# Patient Record
Sex: FEMALE | Race: WHITE | NOT HISPANIC OR LATINO | Employment: FULL TIME | URBAN - METROPOLITAN AREA
[De-identification: names, ages, dates, MRNs, and addresses within clinical notes are randomized per-mention and may not be internally consistent; named-entity substitution may affect disease eponyms.]

---

## 2024-08-07 ENCOUNTER — OFFICE VISIT (OUTPATIENT)
Dept: OBSTETRICS AND GYNECOLOGY | Facility: CLINIC | Age: 44
End: 2024-08-07
Payer: COMMERCIAL

## 2024-08-07 VITALS
SYSTOLIC BLOOD PRESSURE: 130 MMHG | WEIGHT: 142 LBS | DIASTOLIC BLOOD PRESSURE: 74 MMHG | BODY MASS INDEX: 26.81 KG/M2 | HEIGHT: 61 IN | HEART RATE: 75 BPM

## 2024-08-07 DIAGNOSIS — Z01.419 ENCOUNTER FOR GYNECOLOGICAL EXAMINATION WITHOUT ABNORMAL FINDING: Primary | ICD-10-CM

## 2024-08-07 PROCEDURE — G0123 SCREEN CERV/VAG THIN LAYER: HCPCS | Performed by: NURSE PRACTITIONER

## 2024-08-07 PROCEDURE — 87624 HPV HI-RISK TYP POOLED RSLT: CPT | Performed by: NURSE PRACTITIONER

## 2024-08-07 NOTE — PROGRESS NOTES
"Well Woman Visit    CC: Scheduled annual well gyn visit  Chief Complaint   Patient presents with    Gynecologic Exam     wwe       Myriad intake in the past?: no  NOT DONE TODAY due to: new     HPI:   44 y.o.   Social History     Substance and Sexual Activity   Sexual Activity Yes    Partners: Male    Birth control/protection: Vasectomy    Comment: ablation       Menses:   No bleeding since ablation    PCP: does manage PMHx and preventative labs  History: PMHx, Meds, Allergies, PSHx, Social Hx, and POBHx all reviewed and updated.    Pt has no complaints today.    PHYSICAL EXAM:  /74   Pulse 75   Ht 154.9 cm (61\")   Wt 64.4 kg (142 lb)   Breastfeeding No   BMI 26.83 kg/m²  Not found.     Exam conducted with a chaperone present  General- NAD, alert and oriented, appropriate  Psych- Normal mood, good memory  Neck- No masses, no thyroid enlargement  CV- Regular rhythm, no murnurs  Resp- CTA to bases, no wheezes  Abdomen- Soft, non distended, non tender, no masses    Breast left-  Bilaterally symmetrical, no masses, non tender, no nipple discharge  Breast right- Bilaterally symmetrical, no masses, non tender, no nipple discharge    External genitalia- Normal female, no lesions  Urethra/meatus- Normal, no masses, non tender  Bladder- Normal, no masses, non tender  Vagina- Normal, no atrophy, no lesions, no discharge.  Prolapse : none noted, not examined with split speculum to delineate  Cvx- Normal, no lesions, no discharge, No cervical motion tenderness  Uterus- Normal size, shape & consistency.  Non tender, mobile.  Adnexa- No mass, non tender  Anus/Rectum/Perineum- Not performed    Lymphatic- No palpable neck, axillary, or groin nodes  Ext- No edema, no cyanosis    Skin- No lesions, no rashes, no acanthosis nigricans      ASSESSMENT and PLAN:    Diagnoses and all orders for this visit:    1. Encounter for gynecological examination without abnormal finding (Primary)  -     IgP, Aptima HPV  -     Mammo " Screening Digital Tomosynthesis Bilateral With CAD; Future        Preventative:  BREAST HEALTH- Monthly self breast exam importance and how to reviewed. MMG and/or MRI (prn) reviewed per society guidelines and her individual history. Screen: Updated today  CERVICAL CANCER Screening- Reviewed current ASCCP guidelines for screening w and wo cotest HPV, age specific.  Screen: Updated today  SEXUAL HEALTH: Declines STD screening  VACCINATIONS Recommended: Covid vaccine, Flu annually.  Importance discussed, risk being unvaccinated reviewed.  Questions answered  Smoking status- NON SMOKER/VAPER        She understands the importance of having any ordered tests to be performed in a timely fashion.  The risks of not performing them include, but are not limited to, advanced cancer stages, bone loss from osteoporosis and/or subsequent increase in morbidity and/or mortality.  She is encouraged to review her results online and/or contact or office if she has questions.     Follow Up:  Return in about 1 year (around 8/7/2025) for Annual physical.          Joby Leo, APRN  08/07/2024    [facility] [place]  [facility]  [address] [address]  Dept: [phone]  Dept Fax: [phone]  Loc: [phone]

## 2024-08-13 LAB
CYTOLOGIST CVX/VAG CYTO: NORMAL
CYTOLOGY CVX/VAG DOC CYTO: NORMAL
CYTOLOGY CVX/VAG DOC THIN PREP: NORMAL
DX ICD CODE: NORMAL
HPV I/H RISK 4 DNA CVX QL PROBE+SIG AMP: NEGATIVE
Lab: NORMAL
OTHER STN SPEC: NORMAL
STAT OF ADQ CVX/VAG CYTO-IMP: NORMAL

## 2024-09-10 ENCOUNTER — HOSPITAL ENCOUNTER (OUTPATIENT)
Dept: MAMMOGRAPHY | Facility: HOSPITAL | Age: 44
Discharge: HOME | End: 2024-09-10
Admitting: NURSE PRACTITIONER
Payer: COMMERCIAL

## 2024-09-10 DIAGNOSIS — Z01.419 ENCOUNTER FOR GYNECOLOGICAL EXAMINATION WITHOUT ABNORMAL FINDING: ICD-10-CM

## 2024-09-10 PROCEDURE — 77067 SCR MAMMO BI INCL CAD: CPT

## 2024-09-10 PROCEDURE — 77063 BREAST TOMOSYNTHESIS BI: CPT

## 2024-11-14 ENCOUNTER — OFFICE VISIT (OUTPATIENT)
Dept: OBSTETRICS AND GYNECOLOGY | Facility: CLINIC | Age: 44
End: 2024-11-14
Payer: COMMERCIAL

## 2024-11-14 VITALS
BODY MASS INDEX: 26.83 KG/M2 | HEART RATE: 76 BPM | SYSTOLIC BLOOD PRESSURE: 122 MMHG | DIASTOLIC BLOOD PRESSURE: 82 MMHG | WEIGHT: 142 LBS

## 2024-11-14 DIAGNOSIS — N89.8 VAGINAL ODOR: Primary | ICD-10-CM

## 2024-11-14 LAB
BILIRUB BLD-MCNC: NEGATIVE MG/DL
CLARITY, POC: ABNORMAL
COLOR UR: YELLOW
GLUCOSE UR STRIP-MCNC: NEGATIVE MG/DL
KETONES UR QL: NEGATIVE
LEUKOCYTE EST, POC: ABNORMAL
NITRITE UR-MCNC: NEGATIVE MG/ML
PH UR: 7 [PH] (ref 5–8)
PROT UR STRIP-MCNC: NEGATIVE MG/DL
RBC # UR STRIP: NEGATIVE /UL
SP GR UR: 1.01 (ref 1–1.03)
UROBILINOGEN UR QL: NORMAL

## 2024-11-14 RX ORDER — METRONIDAZOLE 500 MG/1
500 TABLET ORAL 2 TIMES DAILY
Qty: 14 TABLET | Refills: 0 | Status: SHIPPED | OUTPATIENT
Start: 2024-11-14 | End: 2024-11-21

## 2024-11-14 NOTE — PROGRESS NOTES
GYN Visit    CC:   Chief Complaint   Patient presents with    Follow-up     Vaginal discharge and odor        HPI:   44 y.o. Contraception or HRT: Contraception:  Vasectomy     Menses:   no cycle since ablation  Pain:  None    Feels like she has increased discharge, with an odor.  Has been going for a few weeks.  No concerns for STIs.        History: PMHx, Meds, Allergies, PSHx, Social Hx, and POBHx all reviewed and updated.    Review of Systems   Constitutional: Negative.    Genitourinary:  Positive for vaginal discharge.       PHYSICAL EXAM:  /82   Pulse 76   Wt 64.4 kg (142 lb)   BMI 26.83 kg/m²      Physical Exam  Vitals and nursing note reviewed. Exam conducted with a chaperone present.   Constitutional:       Appearance: Normal appearance. She is well-developed and well-groomed.   HENT:      Head: Normocephalic.   Eyes:      Pupils: Pupils are equal, round, and reactive to light.   Genitourinary:     General: Normal vulva.      Exam position: Lithotomy position.      Pubic Area: No rash or pubic lice.       Labia:         Right: No rash, tenderness, lesion or injury.         Left: No rash, tenderness, lesion or injury.       Vagina: Normal. No foreign body. Vaginal discharge (thin, white) present. No erythema, tenderness, bleeding or lesions.      Cervix: No cervical motion tenderness or discharge.   Skin:     General: Skin is warm and dry.   Neurological:      General: No focal deficit present.      Mental Status: She is alert.         ASSESSMENT AND PLAN:  Diagnoses and all orders for this visit:    1. Vaginal odor (Primary)  -     POC Urinalysis Dipstick  -     NuSwab BV & Candida - Swab, Cervix  -     metroNIDAZOLE (Flagyl) 500 MG tablet; Take 1 tablet by mouth 2 (Two) Times a Day for 7 days.  Dispense: 14 tablet; Refill: 0        Counseling:  Will treat for BV while awaiting swab results    Follow Up:  Return for as needed.      Joby Leo, APRN  2024    [facility]  [facility]  [address] [place] [address]  Dept: [phone]  Dept Fax: [phone]  Loc: [phone]

## 2024-11-16 LAB
A VAGINAE DNA VAG QL NAA+PROBE: NORMAL SCORE
BVAB2 DNA VAG QL NAA+PROBE: NORMAL SCORE
C ALBICANS DNA VAG QL NAA+PROBE: NEGATIVE
C GLABRATA DNA VAG QL NAA+PROBE: NEGATIVE
MEGA1 DNA VAG QL NAA+PROBE: NORMAL SCORE

## 2025-06-05 ENCOUNTER — OFFICE VISIT (OUTPATIENT)
Dept: OBSTETRICS AND GYNECOLOGY | Age: 45
End: 2025-06-05
Payer: COMMERCIAL

## 2025-06-05 VITALS
BODY MASS INDEX: 25.51 KG/M2 | DIASTOLIC BLOOD PRESSURE: 79 MMHG | SYSTOLIC BLOOD PRESSURE: 116 MMHG | WEIGHT: 135 LBS | HEART RATE: 86 BPM

## 2025-06-05 DIAGNOSIS — R61 NIGHT SWEATS: ICD-10-CM

## 2025-06-05 DIAGNOSIS — R35.0 URINE FREQUENCY: Primary | ICD-10-CM

## 2025-06-05 LAB
BILIRUB BLD-MCNC: NEGATIVE MG/DL
CLARITY, POC: ABNORMAL
COLOR UR: YELLOW
FSH SERPL-ACNC: 15.9 MIU/ML
GLUCOSE UR STRIP-MCNC: NEGATIVE MG/DL
KETONES UR QL: ABNORMAL
LEUKOCYTE EST, POC: NEGATIVE
NITRITE UR-MCNC: NEGATIVE MG/ML
PH UR: 5 [PH] (ref 5–8)
PROT UR STRIP-MCNC: ABNORMAL MG/DL
RBC # UR STRIP: ABNORMAL /UL
SP GR UR: 1.03 (ref 1–1.03)
T4 FREE SERPL-MCNC: 1.23 NG/DL (ref 0.92–1.68)
TSH SERPL DL<=0.05 MIU/L-ACNC: 1.34 UIU/ML (ref 0.27–4.2)
UROBILINOGEN UR QL: NORMAL

## 2025-06-05 PROCEDURE — 84443 ASSAY THYROID STIM HORMONE: CPT | Performed by: NURSE PRACTITIONER

## 2025-06-05 PROCEDURE — 83001 ASSAY OF GONADOTROPIN (FSH): CPT | Performed by: NURSE PRACTITIONER

## 2025-06-05 PROCEDURE — 87086 URINE CULTURE/COLONY COUNT: CPT | Performed by: NURSE PRACTITIONER

## 2025-06-05 PROCEDURE — 84439 ASSAY OF FREE THYROXINE: CPT | Performed by: NURSE PRACTITIONER

## 2025-06-05 NOTE — PROGRESS NOTES
GYN Visit    CC:   Chief Complaint   Patient presents with    Follow-up     Stress incontinence feels wet all the time       HPI:   44 y.o. Contraception or HRT: Contraception:  None    Patient is here with concerns today.   Feels wet all the time. It smells like urine.  Constant all day.  Has been going for a few months.  No pain with urination.  Does not lose urine with cough or sneezing.  Does have urinary frequency.    Has been having night sweats.     History: PMHx, Meds, Allergies, PSHx, Social Hx, and POBHx all reviewed and updated.    Review of Systems   Constitutional: Negative.    Genitourinary:  Positive for frequency.        Night sweats       PHYSICAL EXAM:  /79   Pulse 86   Wt 61.2 kg (135 lb)   BMI 25.51 kg/m²      Physical Exam  Vitals and nursing note reviewed. Exam conducted with a chaperone present.   Constitutional:       Appearance: Normal appearance. She is well-developed and well-groomed.   HENT:      Head: Normocephalic.   Eyes:      Pupils: Pupils are equal, round, and reactive to light.   Genitourinary:     General: Normal vulva.      Exam position: Lithotomy position.      Pubic Area: No rash or pubic lice.       Labia:         Right: No rash, tenderness, lesion or injury.         Left: No rash, tenderness, lesion or injury.       Vagina: Normal. No foreign body. No vaginal discharge, erythema, tenderness, bleeding or lesions.      Cervix: No cervical motion tenderness or discharge.      Uterus: Normal.       Adnexa: Right adnexa normal and left adnexa normal.      Comments: Examined with split speculum, no prolapse noted  Skin:     General: Skin is warm and dry.   Neurological:      General: No focal deficit present.      Mental Status: She is alert.         ASSESSMENT AND PLAN:  Diagnoses and all orders for this visit:    1. Urine frequency (Primary)  -     POC Urinalysis Dipstick  -     Urine Culture - Urine, Urine, Clean Catch    2. Night sweats  -     Follicle  Stimulating Hormone  -     TSH  -     T4, Free        Counseling:  Will send urine for culture, if negative, will place urology referral    Follow Up:  Return for as needed.        Joby Leo, APRN  06/05/2025    [facility] [place] [id number]  [facility]  [facility] [place] [address]  Dept: [phone]  Dept Fax: [phone]  Loc: [phone]

## 2025-06-07 LAB — BACTERIA SPEC AEROBE CULT: NO GROWTH

## 2025-06-13 ENCOUNTER — TELEPHONE (OUTPATIENT)
Dept: OBSTETRICS AND GYNECOLOGY | Age: 45
End: 2025-06-13
Payer: COMMERCIAL

## 2025-06-13 DIAGNOSIS — R35.0 URINE FREQUENCY: Primary | ICD-10-CM

## 2025-06-13 NOTE — TELEPHONE ENCOUNTER
Caller: Carline Bergman    Relationship: Self    Best call back number: [phone]    Caller requesting test results: CARLINE BERGMAN    What test was performed: LABS AND U/A    When was the test performed: 06/05/25    Where was the test performed:     Additional notes: PATIENT WAS TOLD SOMEONE WOULD CALL HER ON MONDAY - PATIENT HAS BEEN CALLING THE CLINICAL LINE SINCE TUESDAY AND NO ANSWER.

## 2025-07-16 NOTE — PROGRESS NOTES
Chief Complaint: Urinary Frequency, new patient, and Urinary Incontinence    Patient or patient representative verbalized consent for the use of Ambient Listening during the visit with  CATHY Duron for chart documentation. 7/17/2025  09:50 EDT    Subjective           History of Present Illness  Carline Bergman is a 45 y.o. female presents to [facility] to be seen for urinary frequency.    She reports a persistent sensation of wetness, akin to a small bubble bursting, which she first noticed a few months ago. This was accompanied by an odor of urine, although she did not feel as though she was urinating. The frequency of these episodes has decreased recently. She has resumed wearing underwear and using a liner daily, but stopped this week due to the absence of urine on the liner. She sought medical attention for this issue, but no cause was identified. She estimates that her condition has improved by 90 percent, as she no longer detects the smell of urine.     She does not experience any leakage during coughing or sneezing, and reports no symptoms suggestive of infection such as pain or burning. There is no presence of blood in her urine. She suspects that stress may have been a contributing factor, as her symptoms have improved since a stressful coworker left her workplace. She also reports increased urinary frequency and urgency, but is able to reach the bathroom in time. She sleeps through the night without needing to urinate, unless awakened by her son.     She has no history of kidney stones, recurrent UTIs, or urological surgery. Her overall health is good, with no heart or lung issues, and she is not diabetic. She does not take any daily medications and does not use tobacco. Her daily fluid intake consists of one cup of coffee in the morning and water throughout the day.    SOCIAL HISTORY  She does not use tobacco.    FAMILY HISTORY  Her father had kidney cancer and was  "diagnosed at the age of 50.  Frequency-admits, but this is improving     Urgency-admits     Incontinence-none currently     Nocturia-denies     Dysuria-denies     GH-denies    History of stones-denies      surgeries-denies     Family history of  malignancy-father had kidney cancer diagnosed at age 57    Cardiopulmonary-denies     Anticoagulants-none     Smoker-denies     Objective     History reviewed. No pertinent past medical history.    Past Surgical History:   Procedure Laterality Date    BREAST AUGMENTATION Bilateral 2015    ENDOMETRIAL ABLATION      epw    WISDOM TOOTH EXTRACTION         No current outpatient medications on file.    No Known Allergies     Family History   Problem Relation Age of Onset    Stroke Paternal Grandfather     Diabetes Paternal Grandfather     Heart disease Paternal Grandfather         Had bypass surgery    Diabetes Maternal Uncle     Diabetes Paternal Uncle     Cancer Father         Kidney cancer    Kidney cancer Father     Breast cancer Neg Hx     Ovarian cancer Neg Hx     Uterine cancer Neg Hx     Colon cancer Neg Hx     Melanoma Neg Hx     Prostate cancer Neg Hx     Deep vein thrombosis Neg Hx        Social History     Socioeconomic History    Marital status:    Tobacco Use    Smoking status: Never     Passive exposure: Never    Smokeless tobacco: Never   Vaping Use    Vaping status: Never Used   Substance and Sexual Activity    Alcohol use: Yes     Alcohol/week: 2.0 standard drinks of alcohol     Types: 2 Cans of beer per week     Comment: 1 daily    Drug use: Never    Sexual activity: Yes     Partners: Male     Birth control/protection: Vasectomy     Comment: ablation       Vital Signs:   Resp 12   Ht 154.9 cm (61\")   Wt 61.2 kg (135 lb)   BMI 25.51 kg/m²      Physical Exam  Vitals and nursing note reviewed.   Constitutional:       General: She is not in acute distress.     Appearance: Normal appearance. She is not toxic-appearing.   Pulmonary:      Effort: " Pulmonary effort is normal. No respiratory distress.   Neurological:      General: No focal deficit present.      Mental Status: She is alert and oriented to person, place, and time.          Result Review :   The following data was reviewed by: CATHY Duron on 07/17/2025:  Results for orders placed or performed in visit on 07/17/25   Bladder Scan    Collection Time: 07/17/25  9:20 AM   Result Value Ref Range    Urine Volume 0    POC Urinalysis Dipstick, Automated    Collection Time: 07/17/25  9:57 AM    Specimen: Urine   Result Value Ref Range    Color Yellow Yellow, Straw, Dark Yellow, Shawna    Clarity, UA Clear Clear    Specific Gravity  1.015 1.005 - 1.030    pH, Urine 5.5 5.0 - 8.0    Leukocytes Negative Negative    Nitrite, UA Negative Negative    Protein, POC Negative Negative mg/dL    Glucose, UA Negative Negative mg/dL    Ketones, UA Negative Negative    Urobilinogen, UA 0.2 E.U./dL Normal, 0.2 E.U./dL    Bilirubin Negative Negative    Blood, UA Negative Negative    Lot Number [id number]     Expiration Date 3,302,026         Bladder Scan interpretation 07/17/2025    Estimation of residual urine via BVI 3000 [address] Bladder Scan  MA/nurse performing: PANCHO Alegria  Residual Urine: 0 ml  Indication: Urinary frequency    Urinary urgency    History of urinary incontinence   Position: Supine  Examination: Incremental scanning of the suprapubic area using 2.0 MHz transducer using copious amounts of acoustic gel.   Findings: An anechoic area was demonstrated which represented the bladder, with measurement of residual urine as noted. I inspected this myself. In that the residual urine was stable or insignificant, refer to plan for treatment and plan necessary at this time.       Results  Labs   - Urine Analysis: 07/17/2025, No signs of infection or blood        Procedures        Assessment and Plan    Diagnoses and all orders for this visit:    1. Urinary frequency (Primary)  -     Bladder Scan  -     POC  Urinalysis Dipstick, Automated    2. Urinary urgency    3. History of urinary incontinence        Assessment & Plan  1. Urinary incontinence.  Her symptoms have shown improvement over the past few weeks, with no current signs of incontinence. The possibility of stress as a contributing factor to her urinary symptoms was discussed. A urine analysis was conducted today, which yielded normal results with no indications of infection or hematuria. She was advised to maintain adequate hydration without overconsumption, limit caffeine intake, and avoid excessive fluid intake before bedtime. Pelvic floor exercises were recommended to strengthen the relevant muscles. Information on overactive bladder symptoms, pelvic floor exercises, and painful bladder was provided for her reference. She expressed a preference to monitor her condition for a while longer before considering further diagnostic procedures such as a cystoscopy. Should her symptoms recur, she will contact us to schedule an appointment.      Follow Up   Return if symptoms worsen or fail to improve.  Patient was given instructions and counseling regarding her condition or for health maintenance advice. Please see specific information pulled into the AVS if appropriate.         This document has been electronically signed by CATHY Duron  July 17, 2025 13:19 EDT

## 2025-07-17 ENCOUNTER — OFFICE VISIT (OUTPATIENT)
Dept: UROLOGY | Age: 45
End: 2025-07-17
Payer: COMMERCIAL

## 2025-07-17 VITALS — WEIGHT: 135 LBS | RESPIRATION RATE: 12 BRPM | BODY MASS INDEX: 25.49 KG/M2 | HEIGHT: 61 IN

## 2025-07-17 DIAGNOSIS — R35.0 URINARY FREQUENCY: Primary | ICD-10-CM

## 2025-07-17 DIAGNOSIS — Z87.898 HISTORY OF URINARY INCONTINENCE: ICD-10-CM

## 2025-07-17 DIAGNOSIS — R39.15 URINARY URGENCY: ICD-10-CM

## 2025-07-17 LAB
BILIRUB BLD-MCNC: NEGATIVE MG/DL
CLARITY, POC: CLEAR
COLOR UR: YELLOW
EXPIRATION DATE: NORMAL
GLUCOSE UR STRIP-MCNC: NEGATIVE MG/DL
KETONES UR QL: NEGATIVE
LEUKOCYTE EST, POC: NEGATIVE
Lab: NORMAL
NITRITE UR-MCNC: NEGATIVE MG/ML
PH UR: 5.5 [PH] (ref 5–8)
PROT UR STRIP-MCNC: NEGATIVE MG/DL
RBC # UR STRIP: NEGATIVE /UL
SP GR UR: 1.01 (ref 1–1.03)
URINE VOLUME: 0
UROBILINOGEN UR QL: NORMAL

## 2025-07-17 PROCEDURE — 81003 URINALYSIS AUTO W/O SCOPE: CPT | Performed by: NURSE PRACTITIONER

## 2025-07-17 PROCEDURE — 99213 OFFICE O/P EST LOW 20 MIN: CPT | Performed by: NURSE PRACTITIONER

## 2025-08-11 ENCOUNTER — OFFICE VISIT (OUTPATIENT)
Dept: OBSTETRICS AND GYNECOLOGY | Age: 45
End: 2025-08-11
Payer: COMMERCIAL

## 2025-08-11 VITALS
WEIGHT: 134 LBS | SYSTOLIC BLOOD PRESSURE: 111 MMHG | BODY MASS INDEX: 25.32 KG/M2 | DIASTOLIC BLOOD PRESSURE: 70 MMHG | HEART RATE: 76 BPM

## 2025-08-11 DIAGNOSIS — Z01.419 ENCOUNTER FOR GYNECOLOGICAL EXAMINATION WITHOUT ABNORMAL FINDING: Primary | ICD-10-CM

## 2025-08-11 PROCEDURE — 99396 PREV VISIT EST AGE 40-64: CPT | Performed by: NURSE PRACTITIONER

## 2025-08-11 PROCEDURE — 99459 PELVIC EXAMINATION: CPT | Performed by: NURSE PRACTITIONER
